# Patient Record
Sex: FEMALE | Race: WHITE | NOT HISPANIC OR LATINO | Employment: UNEMPLOYED | URBAN - METROPOLITAN AREA
[De-identification: names, ages, dates, MRNs, and addresses within clinical notes are randomized per-mention and may not be internally consistent; named-entity substitution may affect disease eponyms.]

---

## 2023-03-21 ENCOUNTER — OFFICE VISIT (OUTPATIENT)
Dept: OBSTETRICS AND GYNECOLOGY | Facility: CLINIC | Age: 48
End: 2023-03-21
Payer: MEDICAID

## 2023-03-21 VITALS — WEIGHT: 113 LBS | SYSTOLIC BLOOD PRESSURE: 102 MMHG | DIASTOLIC BLOOD PRESSURE: 60 MMHG | RESPIRATION RATE: 16 BRPM

## 2023-03-21 DIAGNOSIS — Z01.419 ENCOUNTER FOR GYNECOLOGICAL EXAMINATION WITHOUT ABNORMAL FINDING: Primary | ICD-10-CM

## 2023-03-21 DIAGNOSIS — N92.1 MENORRHAGIA WITH IRREGULAR CYCLE: ICD-10-CM

## 2023-03-21 DIAGNOSIS — Z12.4 PAPANICOLAOU SMEAR FOR CERVICAL CANCER SCREENING: ICD-10-CM

## 2023-03-21 DIAGNOSIS — Z12.31 ENCOUNTER FOR SCREENING MAMMOGRAM FOR MALIGNANT NEOPLASM OF BREAST: ICD-10-CM

## 2023-03-21 DIAGNOSIS — N39.3 SUI (STRESS URINARY INCONTINENCE, FEMALE): ICD-10-CM

## 2023-03-21 DIAGNOSIS — R10.2 PELVIC PAIN: ICD-10-CM

## 2023-03-21 PROCEDURE — 2014F MENTAL STATUS ASSESS: CPT | Performed by: NURSE PRACTITIONER

## 2023-03-21 PROCEDURE — 1159F MED LIST DOCD IN RCRD: CPT | Performed by: NURSE PRACTITIONER

## 2023-03-21 PROCEDURE — 3008F BODY MASS INDEX DOCD: CPT | Performed by: NURSE PRACTITIONER

## 2023-03-21 PROCEDURE — 99386 PREV VISIT NEW AGE 40-64: CPT | Performed by: NURSE PRACTITIONER

## 2023-03-21 PROCEDURE — 1160F RVW MEDS BY RX/DR IN RCRD: CPT | Performed by: NURSE PRACTITIONER

## 2023-03-21 NOTE — PROGRESS NOTES
Annual Visit     Patient Name: Penelope Bustos  : 1975   MRN: [id number]   Care Team: Patient Care Team:  Provider, No Known as PCP - Laura Gaming APRN as Nurse Practitioner (Nurse Practitioner)    Chief Complaint:    Chief Complaint   Patient presents with   • Annual Exam       HPI: Penelope Bustos is a 47 y.o. year old  presenting to be seen for her gynecologic exam - new pt.   Pap smear approx 5-6 yrs ago - denies hx of abnormal results     S/p BTL   Periods monthly until the last 3-6 months   Has been up to 5-6 wks between periods   LMP 23   Hx menorrhagia - period flow x 7 days with 3 days of heaviest flow - saturates a pad and tampon q 1 hr   No BTB   Reports feelings of pelvic fullness/bloated for the last few months   Has noticed hot flashes recently     Treated for UTI in Dec - sx resolved   Has notice urinary hesitation but she does tend to hold urine too long   ELPIDIO is also present     Mammogram yrs ago in [place]   Would like to start having them done in [place], where she lives      Smoker - has been able to quit for several yrs in the past, but restarted         Subjective      I have reviewed the patients family history, social history, past medical history, past surgical history and have updated it as appropriate.    /60   Resp 16   Wt 51.3 kg (113 lb)   LMP 2023     BMI reviewed: There is no height or weight on file to calculate BMI.      Objective     Physical Exam    Neuro: alert and oriented to person, place and time   General:  alert; cooperative; well developed; well nourished   Skin:  No suspicious lesions seen   Thyroid: normal to inspection and palpation   Lungs:  breathing is unlabored  clear to auscultation bilaterally   Heart:  regular rate and rhythm, S1, S2 normal, no murmur, click, rub or gallop  normal apical impulse   Breasts:  Examined in supine position  Symmetric without masses or skin dimpling  Nipples normal without  inversion, lesions or discharge  There are no palpable axillary nodes   Abdomen: soft, non-tender; no masses  no umbilical or inguinal hernias are present  no hepato-splenomegaly   Pelvis: Clinical staff was present for exam  External genitalia:  normal appearance of the external genitalia including Bartholin's and [place]'s glands.  :  urethral meatus normal;  Vaginal:  normal pink mucosa without prolapse or lesions.  Cervix:  normal appearance.  Uterus:  normal size, shape and consistency.  Adnexa:  normal bimanual exam of the adnexa.  Rectal:  digital rectal exam not performed; anus visually normal appearing.         Assessment / Plan      Assessment  Problems Addressed This Visit    ICD-10-CM ICD-9-CM   1. Encounter for gynecological examination without abnormal finding  Z01.419 V72.31   2. Papanicolaou smear for cervical cancer screening  Z12.4 V76.2   3. Menorrhagia with irregular cycle  N92.1 626.2   4. Pelvic pain  R10.2 [id number]   5. ELPIDIO (stress urinary incontinence, female)  N39.3 625.6   6. Encounter for screening mammogram for malignant neoplasm of breast  Z12.31 V76.12       Plan    Pap smear pending   Discussed monthly SBes and importance of annual breast imaging   Mammogram order given   Discussed perimenopause vs menopause   Will monitor bldg calendar and if ever 60 days between periods, call for Provera to induce withdrawal bldg - pt v/u   No labs done in the last yr or pelvic u/s   Check CBC, CMP, TSH and u/s   Will call to discuss results     Discussed importance of voiding with first urge   Discussed avoidance of dietary irritants and bending forward after urination   If urinary hesitation continues, will refer to urology for further evaluation     AV 1 yr         40 to 64: Counseling/Anticipatory Guidance Discussed: screenings and self-breast exam    Follow Up  Return in about 1 year (around 3/21/2024) for Annual physical.  Patient was given instructions and counseling regarding her condition or  for health maintenance advice. Please see specific information pulled into the AVS if appropriate.     Laura Cota, CASSIA  March 21, 2023  11:45 EDT

## 2023-03-22 LAB — REF LAB TEST METHOD: NORMAL

## 2023-03-28 ENCOUNTER — TELEPHONE (OUTPATIENT)
Dept: OBSTETRICS AND GYNECOLOGY | Facility: CLINIC | Age: 48
End: 2023-03-28
Payer: MEDICAID

## 2023-03-28 DIAGNOSIS — N92.1 MENORRHAGIA WITH IRREGULAR CYCLE: Primary | ICD-10-CM

## 2023-04-11 ENCOUNTER — TELEPHONE (OUTPATIENT)
Dept: OBSTETRICS AND GYNECOLOGY | Facility: CLINIC | Age: 48
End: 2023-04-11
Payer: MEDICAID

## 2023-04-11 NOTE — TELEPHONE ENCOUNTER
My Chart msg to pt stressing importance of f/u and instructed to call the office to schedule SIS.  Call with any questions/concerns.

## 2023-06-29 PROBLEM — Z01.419 WELL WOMAN EXAM: Status: ACTIVE | Noted: 2023-06-29

## 2023-06-30 PROBLEM — Z97.5 IUD (INTRAUTERINE DEVICE) IN PLACE: Status: ACTIVE | Noted: 2023-06-30

## 2023-08-25 ENCOUNTER — OFFICE VISIT (OUTPATIENT)
Dept: OBSTETRICS AND GYNECOLOGY | Facility: CLINIC | Age: 48
End: 2023-08-25
Payer: MEDICAID

## 2023-08-25 VITALS — SYSTOLIC BLOOD PRESSURE: 108 MMHG | DIASTOLIC BLOOD PRESSURE: 64 MMHG | WEIGHT: 112 LBS

## 2023-08-25 DIAGNOSIS — Z30.431 CHECKING OF INTRAUTERINE DEVICE: Primary | ICD-10-CM

## 2023-08-25 PROCEDURE — 99213 OFFICE O/P EST LOW 20 MIN: CPT | Performed by: OBSTETRICS & GYNECOLOGY

## 2023-08-25 PROCEDURE — 1159F MED LIST DOCD IN RCRD: CPT | Performed by: OBSTETRICS & GYNECOLOGY

## 2023-08-25 PROCEDURE — 1160F RVW MEDS BY RX/DR IN RCRD: CPT | Performed by: OBSTETRICS & GYNECOLOGY

## 2023-08-25 NOTE — PROGRESS NOTES
Subjective   Chief Complaint   Patient presents with    Follow-up     Iud check      Penelope Bustos is a 47 y.o. year old  presenting to be seen for follow-up of her recently placed Mirena.  Since the time of insertion she has been sexually active.  [place] has not been painful for her.   [place] has not been painful for her partner.    She is having no pain.  She has not noticed much change in her bleeding patterns as of yet       Objective   /64   Wt 50.8 kg (112 lb)     Imaging   Pelvic ultrasound images independantly reviewed - ultrasound is normal.  IUD is appropriately positioned in the uterus with the arms open at the fundus       Assessment   Appropriate position of IUD     Plan   I once again reassured her that it may take 4 to 6 months time to see how much bleeding is improved but anticipate in the coming cycles of will get better  The importance of keeping all planned follow-up and taking all medications as prescribed was emphasized.  Follow up in 3 to 4 months for recheck.            Total time spent today with Penelope  was 20-29 minutes (level 3).  Greater than 50% of the time was spent coordinating care, answering her questions and counseling regarding pathophysiology of her presenting problem along with plans for any diagnositc work-up and treatment.    This note was electronically signed.    Williams Khan M.D.  2023    Part of this note may be an electronic transcription/translation of spoken language to printed text using the Dragon Dictation System.